# Patient Record
Sex: FEMALE
[De-identification: names, ages, dates, MRNs, and addresses within clinical notes are randomized per-mention and may not be internally consistent; named-entity substitution may affect disease eponyms.]

---

## 2023-03-10 ENCOUNTER — NURSE TRIAGE (OUTPATIENT)
Dept: OTHER | Facility: CLINIC | Age: 33
End: 2023-03-10

## 2023-03-11 NOTE — TELEPHONE ENCOUNTER
Location of patient: [place]    Subjective: Caller states \"My doctor prescribed me PROzac and melatonin can they be taken in the same day? \"     Current Symptoms: n/a    Onset: 0 n/a ago; n/a    Associated Symptoms: NA    Pain Severity: 0/10; N/A; none    Temperature: n/a  n/a    What has been tried: n/a    LMP: NA Pregnant: NA    Recommended disposition:  follow up with a pharmacist within 24 hours    Care advice provided, patient verbalizes understanding; denies any other questions or concerns; instructed to call back for any new or worsening symptoms. Patient states she will follow up with pharmacist tomorrow. This triage is a result of a call to [facility]. Please do not respond to the triager through this encounter. Any subsequent communication should be directly with patient.   Reason for Disposition   [1] Caller has medicine question about med NOT prescribed by PCP AND [2] triager unable to answer question (e.g., compatibility with other med, storage)    Protocols used: Medication Question Call-ADULT-